# Patient Record
Sex: FEMALE | URBAN - METROPOLITAN AREA
[De-identification: names, ages, dates, MRNs, and addresses within clinical notes are randomized per-mention and may not be internally consistent; named-entity substitution may affect disease eponyms.]

---

## 2020-05-13 ENCOUNTER — APPOINTMENT (RX ONLY)
Dept: URBAN - METROPOLITAN AREA CLINIC 110 | Facility: CLINIC | Age: 45
Setting detail: DERMATOLOGY
End: 2020-05-13

## 2020-05-13 DIAGNOSIS — Z02.9 ENCOUNTER FOR ADMINISTRATIVE EXAMINATIONS, UNSPECIFIED: ICD-10-CM

## 2020-05-13 PROCEDURE — ? ADDITIONAL NOTES

## 2020-05-13 ASSESSMENT — LOCATION DETAILED DESCRIPTION DERM: LOCATION DETAILED: 1ST WEBSPACE LEFT FOOT

## 2020-05-13 ASSESSMENT — LOCATION SIMPLE DESCRIPTION DERM: LOCATION SIMPLE: LEFT FOOT

## 2020-05-13 ASSESSMENT — LOCATION ZONE DERM: LOCATION ZONE: FEET

## 2020-05-13 NOTE — PROCEDURE: ADDITIONAL NOTES
Detail Level: Simple
Additional Notes: Patient was able to start her video conference briefly on her computer, but the video soon failed due to technical difficulties on the patient’s end. Video conference was attempted several more times on the patient’s computer, smart phone, and tablet but all attempts were unsuccessful. Patient volunteered to reschedule her appointment, and the patient was informed that our clinic was again seeing patients in-person on Mondays and Thursdays. Patient elects to schedule an in-person visit and reports that she is familiar with our clinic as her daughter is a patient there.

## 2020-05-14 ENCOUNTER — APPOINTMENT (RX ONLY)
Dept: URBAN - METROPOLITAN AREA CLINIC 110 | Facility: CLINIC | Age: 45
Setting detail: DERMATOLOGY
End: 2020-05-14

## 2020-05-14 DIAGNOSIS — B35.1 TINEA UNGUIUM: ICD-10-CM

## 2020-05-14 PROCEDURE — ? COUNSELING

## 2020-05-14 PROCEDURE — ? ORDER TESTS

## 2020-05-14 PROCEDURE — ? FULL BODY SKIN EXAM - DECLINED

## 2020-05-14 PROCEDURE — ? PRESCRIPTION

## 2020-05-14 PROCEDURE — 99212 OFFICE O/P EST SF 10 MIN: CPT

## 2020-05-14 RX ORDER — CICLOPIROX 80 MG/ML
SOLUTION TOPICAL
Qty: 1 | Refills: 11 | Status: ERX | COMMUNITY
Start: 2020-05-14

## 2020-05-14 RX ADMIN — CICLOPIROX: 80 SOLUTION TOPICAL at 00:00

## 2020-05-14 ASSESSMENT — LOCATION DETAILED DESCRIPTION DERM
LOCATION DETAILED: LEFT GREAT TOENAIL
LOCATION DETAILED: RIGHT GREAT TOENAIL
LOCATION DETAILED: LEFT GREAT TOENAIL

## 2020-05-14 ASSESSMENT — LOCATION SIMPLE DESCRIPTION DERM
LOCATION SIMPLE: LEFT GREAT TOE
LOCATION SIMPLE: RIGHT GREAT TOE
LOCATION SIMPLE: LEFT GREAT TOE

## 2020-05-14 ASSESSMENT — LOCATION ZONE DERM
LOCATION ZONE: TOENAIL
LOCATION ZONE: TOENAIL

## 2020-05-14 NOTE — PROCEDURE: MIPS QUALITY
Name And Contact Information For Health Care Proxy: [phone] Name And Contact Information For Health Care Proxy: husband-[phone]

## 2020-05-14 NOTE — PROCEDURE: COUNSELING
Detail Level: Detailed
Patient Specific Counseling (Will Not Stick From Patient To Patient): Pt reported she did labs in January 2020 and will have her labs faxed over to our office before prescribing Terbinafine. She will try Ciclopirox as directed. Hepatic function labs ordered in case pt can not obtain labs she has done previously.